# Patient Record
Sex: FEMALE | Race: WHITE | NOT HISPANIC OR LATINO | URBAN - NONMETROPOLITAN AREA
[De-identification: names, ages, dates, MRNs, and addresses within clinical notes are randomized per-mention and may not be internally consistent; named-entity substitution may affect disease eponyms.]

---

## 2018-12-14 PROBLEM — Z98.41: Noted: 2018-12-14

## 2018-12-14 PROBLEM — Z98.42: Noted: 2018-12-14

## 2019-03-15 ENCOUNTER — IMPORTED ENCOUNTER (OUTPATIENT)
Dept: URBAN - NONMETROPOLITAN AREA CLINIC 1 | Facility: CLINIC | Age: 69
End: 2019-03-15

## 2019-03-15 PROCEDURE — 99213 OFFICE O/P EST LOW 20 MIN: CPT

## 2019-03-15 NOTE — PATIENT DISCUSSION
s/p PCIOL OU w/PCO OU-  discussed findings w/patient-  Pt doing well s/p PCIOL-  Patient does think that she has some blurry vision at times-  Continue to monitor as scheduled or prnDES OU-  discussed findings w/patient-  start Refresh Gerard 3 at least BID-TID OU-  continue to monitor 1 mo or prnCorneal Edema OU-  discussed findings w/patient-  start Leyda 128 5% at least TID OU-  RTC 1 mo or prn

## 2019-04-25 ENCOUNTER — IMPORTED ENCOUNTER (OUTPATIENT)
Dept: URBAN - NONMETROPOLITAN AREA CLINIC 1 | Facility: CLINIC | Age: 69
End: 2019-04-25

## 2019-04-25 PROBLEM — Z98.41: Noted: 2018-12-14

## 2019-04-25 PROBLEM — H26.493: Noted: 2019-04-25

## 2019-04-25 PROBLEM — Z98.42: Noted: 2018-12-14

## 2019-04-25 PROCEDURE — 99213 OFFICE O/P EST LOW 20 MIN: CPT

## 2019-04-25 NOTE — PATIENT DISCUSSION
s/p PCIOL OU w/PCO OU worsening OD-  discussed findings w/patient-  Pt doing well s/p PCIOL-  Patient does notice changes OD>OS-  RTC 3 mo or prnDES OU-  discussed findings w/patient-  continue Refresh Gerard 3 at least BID-TID OU-  continue to monitor 1 mo or prnCorneal Edema OU 2* guttatae resolved-  discussed findings w/patient-  continue Leyda 128 5% at least TID OU-  continue to monitor as scheduled or prn

## 2019-09-25 ENCOUNTER — IMPORTED ENCOUNTER (OUTPATIENT)
Dept: URBAN - NONMETROPOLITAN AREA CLINIC 1 | Facility: CLINIC | Age: 69
End: 2019-09-25

## 2019-09-25 PROBLEM — H26.493: Noted: 2019-09-25

## 2019-09-25 PROBLEM — Z96.1: Noted: 2019-09-25

## 2019-09-25 PROCEDURE — 99213 OFFICE O/P EST LOW 20 MIN: CPT

## 2019-09-25 NOTE — PATIENT DISCUSSION
s/p PCIOL OU w/PCO OU-  discussed findings w/patient-  Both intraocular lenses are stable and in place -  PCO has progressed and BAT today is 20/40 OU.  Will make referral if patient is ready. -  RTC 6 month f/u PCO w/ BAT (or referral when patient is ready for YAG PC OU)LACI OU-  discussed findings w/patient-  continue Refresh Gerard 3 at least BID-TID OU samples given in office today-  continue to monitor 1 mo or prnGuttata OU-  discussed findings w/patient-  continue Leyda 128 5% -  continue to monitor as scheduled or prn

## 2020-03-04 ENCOUNTER — IMPORTED ENCOUNTER (OUTPATIENT)
Dept: URBAN - NONMETROPOLITAN AREA CLINIC 1 | Facility: CLINIC | Age: 70
End: 2020-03-04

## 2020-03-04 PROCEDURE — 99213 OFFICE O/P EST LOW 20 MIN: CPT

## 2020-03-04 NOTE — PATIENT DISCUSSION
s/p PCIOL OU w/PCO OU-  discussed findings w/patient-  patient is having more difficulty w/OD and BATs to 20/50 today-  refer to [address] [place] [address] for PCO molly AGUERO OU-  discussed findings w/patient-  continue Refresh Gerard 3 at least BID-TID OU samples given in office today-  continue to monitor 1 mo or prnGuttata OU-  discussed findings w/patient-  continue Leyda 128 5% -  continue to monitor as scheduled or prn

## 2020-03-13 ENCOUNTER — IMPORTED ENCOUNTER (OUTPATIENT)
Dept: URBAN - NONMETROPOLITAN AREA CLINIC 1 | Facility: CLINIC | Age: 70
End: 2020-03-13

## 2020-03-13 PROCEDURE — 66821 AFTER CATARACT LASER SURGERY: CPT

## 2020-03-13 NOTE — PATIENT DISCUSSION
PCO-Explained PCO and RBAs of YAG Capsulotomy to pt. -Pt elects to proceed. YAG Caps OD today and YAG Caps OS in 1-2 weeks.

## 2022-04-09 ASSESSMENT — TONOMETRY
OS_IOP_MMHG: 11
OS_IOP_MMHG: 13
OD_IOP_MMHG: 10
OD_IOP_MMHG: 16
OD_IOP_MMHG: 13
OS_IOP_MMHG: 15
OD_IOP_MMHG: 10
OS_IOP_MMHG: 12
OD_IOP_MMHG: 12
OS_IOP_MMHG: 12

## 2022-04-09 ASSESSMENT — VISUAL ACUITY
OS_CC: 20/20 BLURRY
OS_CC: 20/25
OS_CC: 20/20
OD_GLARE: 20/40-1
OU_CC: 20/20
OS_CC: 20/25
OD_CC: 20/25-1
OS_GLARE: 20/40-1
OS_CC: 20/20
OD_CC: 20/20
OS_GLARE: 20/25-2
OD_CC: 20/20 BLURRY
OD_GLARE: 20/50
OD_CC: 20/20
OS_GLARE: 20/40
OU_CC: 20/20
OU_SC: 20/25
OU_SC: 20/30
OD_GLARE: 20/40
OD_CC: 20/20-2